# Patient Record
Sex: MALE | Race: BLACK OR AFRICAN AMERICAN | Employment: UNEMPLOYED | URBAN - METROPOLITAN AREA
[De-identification: names, ages, dates, MRNs, and addresses within clinical notes are randomized per-mention and may not be internally consistent; named-entity substitution may affect disease eponyms.]

---

## 2018-04-25 ENCOUNTER — OFFICE VISIT (OUTPATIENT)
Dept: ORTHOPEDIC SURGERY | Age: 48
End: 2018-04-25

## 2018-04-25 VITALS
HEART RATE: 91 BPM | WEIGHT: 186 LBS | HEIGHT: 68 IN | SYSTOLIC BLOOD PRESSURE: 121 MMHG | BODY MASS INDEX: 28.19 KG/M2 | RESPIRATION RATE: 16 BRPM | DIASTOLIC BLOOD PRESSURE: 76 MMHG

## 2018-04-25 DIAGNOSIS — S80.02XA CONTUSION OF LEFT KNEE, INITIAL ENCOUNTER: ICD-10-CM

## 2018-04-25 DIAGNOSIS — S52.572A OTHER CLOSED INTRA-ARTICULAR FRACTURE OF DISTAL END OF LEFT RADIUS, INITIAL ENCOUNTER: Primary | ICD-10-CM

## 2018-04-25 DIAGNOSIS — M25.532 LEFT WRIST PAIN: ICD-10-CM

## 2018-04-25 PROCEDURE — 25600 CLTX DST RDL FX/EPHYS SEP WO: CPT | Performed by: ORTHOPAEDIC SURGERY

## 2018-04-25 PROCEDURE — G8427 DOCREV CUR MEDS BY ELIG CLIN: HCPCS | Performed by: ORTHOPAEDIC SURGERY

## 2018-04-25 PROCEDURE — G8419 CALC BMI OUT NRM PARAM NOF/U: HCPCS | Performed by: ORTHOPAEDIC SURGERY

## 2018-04-25 PROCEDURE — L3908 WHO COCK-UP NONMOLDE PRE OTS: HCPCS | Performed by: ORTHOPAEDIC SURGERY

## 2018-04-25 PROCEDURE — 99203 OFFICE O/P NEW LOW 30 MIN: CPT | Performed by: ORTHOPAEDIC SURGERY

## 2018-04-25 PROCEDURE — 4004F PT TOBACCO SCREEN RCVD TLK: CPT | Performed by: ORTHOPAEDIC SURGERY

## 2018-04-25 RX ORDER — TRAMADOL HYDROCHLORIDE 50 MG/1
50 TABLET ORAL EVERY 6 HOURS PRN
Qty: 28 TABLET | Refills: 0 | Status: SHIPPED | OUTPATIENT
Start: 2018-04-25 | End: 2018-04-25 | Stop reason: CLARIF

## 2018-04-26 PROBLEM — S80.02XA CONTUSION OF LEFT KNEE: Status: ACTIVE | Noted: 2018-04-26

## 2018-04-26 PROBLEM — S52.502A CLOSED FRACTURE OF LEFT DISTAL RADIUS: Status: ACTIVE | Noted: 2018-04-26

## 2018-04-27 DIAGNOSIS — S52.572A OTHER CLOSED INTRA-ARTICULAR FRACTURE OF DISTAL END OF LEFT RADIUS, INITIAL ENCOUNTER: Primary | ICD-10-CM

## 2018-04-27 PROCEDURE — APPNB15 APP NON BILLABLE TIME 0-15 MINS: Performed by: NURSE PRACTITIONER

## 2018-10-08 ENCOUNTER — HOSPITAL ENCOUNTER (EMERGENCY)
Age: 48
Discharge: HOME | End: 2018-10-08
Payer: COMMERCIAL

## 2018-10-08 VITALS
DIASTOLIC BLOOD PRESSURE: 83 MMHG | RESPIRATION RATE: 16 BRPM | HEART RATE: 86 BPM | BODY MASS INDEX: 30.09 KG/M2 | OXYGEN SATURATION: 99 % | WEIGHT: 192.13 LBS | TEMPERATURE: 97.9 F | SYSTOLIC BLOOD PRESSURE: 122 MMHG

## 2018-10-08 DIAGNOSIS — L24.9 IRRITANT CONTACT DERMATITIS, UNSPECIFIED TRIGGER: Primary | ICD-10-CM

## 2018-10-08 PROCEDURE — 6370000000 HC RX 637 (ALT 250 FOR IP): Performed by: PHYSICIAN ASSISTANT

## 2018-10-08 PROCEDURE — 99282 EMERGENCY DEPT VISIT SF MDM: CPT

## 2018-10-08 RX ORDER — PREDNISONE 20 MG/1
60 TABLET ORAL ONCE
Status: COMPLETED | OUTPATIENT
Start: 2018-10-08 | End: 2018-10-08

## 2018-10-08 RX ORDER — DIPHENHYDRAMINE HCL 25 MG
25 TABLET ORAL ONCE
Status: COMPLETED | OUTPATIENT
Start: 2018-10-08 | End: 2018-10-08

## 2018-10-08 RX ORDER — FAMOTIDINE 20 MG/1
20 TABLET, FILM COATED ORAL 2 TIMES DAILY
Qty: 60 TABLET | Refills: 0 | Status: SHIPPED | OUTPATIENT
Start: 2018-10-08

## 2018-10-08 RX ORDER — PREDNISONE 10 MG/1
TABLET ORAL
Qty: 44 TABLET | Refills: 0 | Status: SHIPPED | OUTPATIENT
Start: 2018-10-08 | End: 2018-10-18

## 2018-10-08 RX ORDER — FAMOTIDINE 20 MG/1
40 TABLET, FILM COATED ORAL ONCE
Status: COMPLETED | OUTPATIENT
Start: 2018-10-08 | End: 2018-10-08

## 2018-10-08 RX ORDER — DIPHENHYDRAMINE HCL 25 MG
25 CAPSULE ORAL EVERY 6 HOURS PRN
Qty: 28 CAPSULE | Refills: 0 | Status: SHIPPED | OUTPATIENT
Start: 2018-10-08 | End: 2018-10-18

## 2018-10-08 RX ADMIN — FAMOTIDINE 40 MG: 20 TABLET, FILM COATED ORAL at 19:20

## 2018-10-08 RX ADMIN — PREDNISONE 60 MG: 20 TABLET ORAL at 19:20

## 2018-10-08 RX ADMIN — DIPHENHYDRAMINE HCL 25 MG: 25 TABLET ORAL at 19:19

## 2018-10-08 ASSESSMENT — ENCOUNTER SYMPTOMS
RESPIRATORY NEGATIVE: 1
CONSTIPATION: 0
NAUSEA: 0
COUGH: 0
DIARRHEA: 0
SHORTNESS OF BREATH: 0
ABDOMINAL PAIN: 0
COLOR CHANGE: 1
VOMITING: 0

## 2018-10-09 NOTE — ED PROVIDER NOTES
as needed for Sleep.      naproxen (NAPROSYN) 500 MG tablet Take 500 mg by mouth 2 times daily (with meals). doxycycline (VIBRAMYCIN) 100 MG capsule Take 100 mg by mouth 2 times daily. potassium citrate (UROCIT-K) 10 MEQ (1080 MG) SR tablet Take 10 mEq by mouth 3 times daily (with meals). emtricitabine-tenofovir (TRUVADA) 200-300 MG per tablet Take 1 tablet by mouth daily. Review of Systems:  Review of Systems   Constitutional: Negative for activity change, appetite change, chills and fever. Respiratory: Negative. Negative for cough and shortness of breath. Cardiovascular: Negative. Negative for chest pain. Gastrointestinal: Negative for abdominal pain, constipation, diarrhea, nausea and vomiting. Genitourinary: Negative for difficulty urinating and dysuria. Musculoskeletal: Negative for arthralgias, myalgias, neck pain and neck stiffness. Skin: Positive for color change and rash. Negative for pallor and wound. Neurological: Negative for dizziness, weakness, light-headedness, numbness and headaches. Positives and Pertinent negatives as per HPI. Except as noted above in the ROS, problem specific ROS was completed and is negative. Physical Exam:  Physical Exam   Constitutional: He is oriented to person, place, and time. He appears well-developed and well-nourished. HENT:   Head: Normocephalic and atraumatic. Right Ear: External ear normal.   Left Ear: External ear normal.   Mouth/Throat: Oropharynx is clear and moist. No oropharyngeal exudate. Eyes: Conjunctivae are normal. Right eye exhibits no discharge. Left eye exhibits no discharge. Neck: Normal range of motion. Neck supple. Cardiovascular: Normal rate, regular rhythm, normal heart sounds and intact distal pulses. Exam reveals no gallop and no friction rub. No murmur heard. Pulmonary/Chest: Effort normal and breath sounds normal. No stridor. No respiratory distress. He has no wheezes.  He has no rales. He exhibits no tenderness. Abdominal: Soft. He exhibits no distension. There is no tenderness. There is no rebound and no guarding. Musculoskeletal: Normal range of motion. Neurological: He is alert and oriented to person, place, and time. Skin: Skin is warm and dry. Rash noted. He is not diaphoretic. No erythema. No pallor. Upon examination patient has urticarial rash noted to the abdomen, chest, back, arms and legs. Spares the palms. Does not involve the face or oral mucosal.  Associated excoriations noted. No vesicles, bullae or pustules noted. No crepitus. No induration or fluctuance. No erythema or warmth. Psychiatric: He has a normal mood and affect. His behavior is normal.       MEDICAL DECISION MAKING    Vitals:    Vitals:    10/08/18 1820   BP: 122/83   Pulse: 86   Resp: 16   Temp: 97.9 °F (36.6 °C)   TempSrc: Infrared   SpO2: 99%   Weight: 192 lb 2 oz (87.1 kg)       LABS: Labs Reviewed - No data to display     Remainder of labs reviewed and were negative at this time or not returned at the time of this note. RADIOLOGY:   Non-plain film images such as CT, Ultrasound and MRI are read by the radiologist. SALVADOR Frankel have directly visualized the radiologic plain film image(s) with the below findings:        Interpretation per the Radiologist below, if available at the time of this note:    No orders to display        No results found. MEDICAL DECISION MAKING / ED COURSE:      PROCEDURES:   Procedures    None    Patient was given:  Medications   diphenhydrAMINE (BENADRYL) tablet 25 mg (25 mg Oral Given 10/8/18 1919)   famotidine (PEPCID) tablet 40 mg (40 mg Oral Given 10/8/18 1920)   predniSONE (DELTASONE) tablet 60 mg (60 mg Oral Given 10/8/18 1920)       Patient is a 26-year-old male who presents ED with complaint of a rash. Upon examination patient is a rash that appears consistent with dermatitis/allergic reaction.   Given Benadryl, Pepcid and prednisone here in mis-transcribed.)    Electronically signed, SALVADOR Multani,         [facility], [address]  10/09/18 2023

## 2019-10-07 PROBLEM — Z51.89 ENCOUNTER FOR REHABILITATION: Status: ACTIVE | Noted: 2019-02-23

## 2019-10-07 PROBLEM — M47.816 SPONDYLOSIS OF LUMBAR REGION WITHOUT MYELOPATHY OR RADICULOPATHY: Status: ACTIVE | Noted: 2018-10-24

## 2019-10-07 PROBLEM — M70.62 GREATER TROCHANTERIC BURSITIS, LEFT: Status: ACTIVE | Noted: 2017-10-30

## 2019-10-07 PROBLEM — E53.8 B12 DEFICIENCY: Status: ACTIVE | Noted: 2019-09-23

## 2019-10-07 PROBLEM — G89.4 CHRONIC PAIN DISORDER: Status: ACTIVE | Noted: 2018-10-24

## 2019-10-07 PROBLEM — T84.59XD PROSTHETIC HIP INFECTION, SUBSEQUENT ENCOUNTER: Status: ACTIVE | Noted: 2019-02-18

## 2019-10-07 PROBLEM — D63.8 ANEMIA OF CHRONIC DISEASE: Status: ACTIVE | Noted: 2019-07-25

## 2019-10-07 PROBLEM — J45.30 MILD PERSISTENT ASTHMA WITHOUT COMPLICATION: Status: ACTIVE | Noted: 2019-07-25

## 2019-10-07 PROBLEM — F17.210 CIGARETTE NICOTINE DEPENDENCE WITHOUT COMPLICATION: Status: ACTIVE | Noted: 2019-07-25

## 2019-10-07 PROBLEM — E55.9 VITAMIN D DEFICIENCY: Status: ACTIVE | Noted: 2019-07-25

## 2019-10-07 PROBLEM — Z96.649 PROSTHETIC HIP INFECTION, SUBSEQUENT ENCOUNTER: Status: ACTIVE | Noted: 2019-02-18

## 2019-10-07 PROBLEM — S80.02XA CONTUSION OF LEFT KNEE: Status: RESOLVED | Noted: 2018-04-26 | Resolved: 2019-10-07

## 2019-10-07 PROBLEM — F99 INSOMNIA DUE TO OTHER MENTAL DISORDER: Status: ACTIVE | Noted: 2019-07-25

## 2019-10-07 PROBLEM — E66.9 OBESITY (BMI 30-39.9): Status: ACTIVE | Noted: 2019-07-25

## 2019-10-07 PROBLEM — F51.05 INSOMNIA DUE TO OTHER MENTAL DISORDER: Status: ACTIVE | Noted: 2019-07-25

## 2020-07-17 ENCOUNTER — APPOINTMENT (OUTPATIENT)
Dept: GENERAL RADIOLOGY | Age: 50
End: 2020-07-17
Payer: COMMERCIAL

## 2020-07-17 ENCOUNTER — HOSPITAL ENCOUNTER (EMERGENCY)
Age: 50
Discharge: HOME | End: 2020-07-17
Payer: COMMERCIAL

## 2020-07-17 VITALS
TEMPERATURE: 98.5 F | DIASTOLIC BLOOD PRESSURE: 80 MMHG | RESPIRATION RATE: 18 BRPM | HEART RATE: 84 BPM | OXYGEN SATURATION: 99 % | SYSTOLIC BLOOD PRESSURE: 126 MMHG

## 2020-07-17 PROCEDURE — 99283 EMERGENCY DEPT VISIT LOW MDM: CPT

## 2020-07-17 PROCEDURE — 72040 X-RAY EXAM NECK SPINE 2-3 VW: CPT

## 2020-07-17 PROCEDURE — 72100 X-RAY EXAM L-S SPINE 2/3 VWS: CPT

## 2020-07-17 PROCEDURE — 6370000000 HC RX 637 (ALT 250 FOR IP): Performed by: PHYSICIAN ASSISTANT

## 2020-07-17 RX ORDER — CYCLOBENZAPRINE HCL 10 MG
10 TABLET ORAL 3 TIMES DAILY PRN
Qty: 12 TABLET | Refills: 0 | Status: SHIPPED | OUTPATIENT
Start: 2020-07-17

## 2020-07-17 RX ORDER — CYCLOBENZAPRINE HCL 10 MG
10 TABLET ORAL ONCE
Status: COMPLETED | OUTPATIENT
Start: 2020-07-17 | End: 2020-07-17

## 2020-07-17 RX ORDER — OXYCODONE HYDROCHLORIDE 5 MG/1
5 TABLET ORAL ONCE
Status: COMPLETED | OUTPATIENT
Start: 2020-07-17 | End: 2020-07-17

## 2020-07-17 RX ADMIN — OXYCODONE HYDROCHLORIDE 5 MG: 5 TABLET ORAL at 17:50

## 2020-07-17 RX ADMIN — CYCLOBENZAPRINE HYDROCHLORIDE 10 MG: 10 TABLET, FILM COATED ORAL at 17:48

## 2020-07-17 ASSESSMENT — PAIN DESCRIPTION - PROGRESSION
CLINICAL_PROGRESSION_2: NOT CHANGED
CLINICAL_PROGRESSION: NOT CHANGED

## 2020-07-17 ASSESSMENT — ENCOUNTER SYMPTOMS
VOMITING: 0
COLOR CHANGE: 0
BACK PAIN: 1
CONSTIPATION: 0
SHORTNESS OF BREATH: 0
ABDOMINAL PAIN: 0

## 2020-07-17 ASSESSMENT — PAIN DESCRIPTION - ORIENTATION
ORIENTATION: MID
ORIENTATION_2: LOWER

## 2020-07-17 ASSESSMENT — PAIN DESCRIPTION - LOCATION
LOCATION: NECK
LOCATION_2: BACK

## 2020-07-17 ASSESSMENT — PAIN - FUNCTIONAL ASSESSMENT: PAIN_FUNCTIONAL_ASSESSMENT: PREVENTS OR INTERFERES SOME ACTIVE ACTIVITIES AND ADLS

## 2020-07-17 ASSESSMENT — PAIN SCALES - GENERAL
PAINLEVEL_OUTOF10: 7
PAINLEVEL_OUTOF10: 6

## 2020-07-17 ASSESSMENT — PAIN DESCRIPTION - ONSET
ONSET: ON-GOING
ONSET_2: ON-GOING

## 2020-07-17 ASSESSMENT — PAIN DESCRIPTION - PAIN TYPE: TYPE: ACUTE PAIN

## 2020-07-17 ASSESSMENT — PAIN DESCRIPTION - INTENSITY: RATING_2: 8

## 2020-07-17 ASSESSMENT — PAIN DESCRIPTION - FREQUENCY: FREQUENCY: CONTINUOUS

## 2020-07-17 ASSESSMENT — PAIN DESCRIPTION - DURATION: DURATION_2: CONTINUOUS
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
## 2021-08-07 PROCEDURE — 99284 EMERGENCY DEPT VISIT MOD MDM: CPT

## 2021-08-07 PROCEDURE — 96372 THER/PROPH/DIAG INJ SC/IM: CPT

## 2021-08-08 ENCOUNTER — APPOINTMENT (OUTPATIENT)
Dept: GENERAL RADIOLOGY | Age: 51
End: 2021-08-08
Payer: COMMERCIAL

## 2021-08-08 ENCOUNTER — HOSPITAL ENCOUNTER (EMERGENCY)
Age: 51
Discharge: HOME | End: 2021-08-08
Payer: COMMERCIAL

## 2021-08-08 VITALS
TEMPERATURE: 98.7 F | OXYGEN SATURATION: 98 % | RESPIRATION RATE: 16 BRPM | HEIGHT: 67 IN | HEART RATE: 74 BPM | DIASTOLIC BLOOD PRESSURE: 89 MMHG | BODY MASS INDEX: 32.18 KG/M2 | SYSTOLIC BLOOD PRESSURE: 149 MMHG | WEIGHT: 205 LBS

## 2021-08-08 DIAGNOSIS — M79.674 GREAT TOE PAIN, RIGHT: Primary | ICD-10-CM

## 2021-08-08 PROCEDURE — 6360000002 HC RX W HCPCS: Performed by: PHYSICIAN ASSISTANT

## 2021-08-08 PROCEDURE — 73660 X-RAY EXAM OF TOE(S): CPT

## 2021-08-08 RX ORDER — NAPROXEN 500 MG/1
500 TABLET ORAL 2 TIMES DAILY WITH MEALS
Qty: 20 TABLET | Refills: 0 | Status: SHIPPED | OUTPATIENT
Start: 2021-08-08 | End: 2021-08-18

## 2021-08-08 RX ADMIN — HYDROMORPHONE HYDROCHLORIDE 0.5 MG: 1 INJECTION, SOLUTION INTRAMUSCULAR; INTRAVENOUS; SUBCUTANEOUS at 02:09

## 2021-08-08 ASSESSMENT — PAIN SCALES - GENERAL
PAINLEVEL_OUTOF10: 2
PAINLEVEL_OUTOF10: 10

## 2021-08-08 ASSESSMENT — ENCOUNTER SYMPTOMS: COLOR CHANGE: 1

## 2021-08-08 NOTE — ED PROVIDER NOTES
[address] [facility]      Pt Name: Jamari Cortes  MRN: [id number]  [place] 1970  Date of evaluation: 8/7/2021  Provider: SALVADOR Inman    This patient was not seen and evaluated by the attending physician No att. providers found. CHIEF COMPLAINT       Chief Complaint   Patient presents with    Toe Injury     Pt has history of gout and current has a red and swollen right great toe       CRITICAL CARE TIME   I performed a total Critical Care time of 15 minutes, excluding separately reportable procedures. There was a high probability of clinically significant/life threatening deterioration in the patient's condition which required my urgent intervention. Not limited to multiple reexaminations, discussions with attending physician and consultants. HISTORY OF PRESENT ILLNESS  (Location/Symptom, Timing/Onset, Context/Setting, Quality, Duration, Modifying Factors, Severity.)   Jamari Cortes is a 53 y.o. male who presents to the emergency department with complaint of right great toe pain. He states that he has not had fever he has history of HIV but states he is undetectable. He states that it feels exact like prior gout that he had in the left great toe. He has a podiatrist to follow-up with although he is unsure of the name. He took his prescribed medications including oxycodone at home with minimal relief today. Rates his pain a 10 out of 10. Has not noted any redness or spreading rash of the foot. Not a diabetic. Nursing Notes were reviewed and I agree. REVIEW OF SYSTEMS    (2-9 systems for level 4, 10 or more for level 5)     Review of Systems   Constitutional: Negative for fever. Musculoskeletal: Positive for arthralgias and joint swelling. Skin: Positive for color change. Negative for wound. Neurological: Negative for numbness. Psychiatric/Behavioral: Negative for agitation and behavioral problems. Except as noted above the remainder of the review of systems was reviewed and negative. PAST MEDICAL HISTORY         Diagnosis Date    Acute appendicitis with generalized peritonitis     Anemia of chronic disease 7/25/2019    Hgb 10.4 on 4/19/19 (). Last Assessment & Plan:  Condition: stable  Follow up in: one year    Anxiety and depression 2/8/2009    Last Assessment & Plan: On wellbutrin Use klonipine sparingly    Asthma     Asymptomatic human immunodeficiency virus (hiv) infection status ([facility] 2/8/2009    Diagnosed 12/1999 Cd4 201 VL [address]56 baseline 1/2000 Genotype nevirapine/sustiva resistance/emtriva 11/2008 Combivir NVP 1/2000 Combivir Norvir Reyataz 2/2005 genvoya 11/2016 biktarvy  Last Assessment & Plan:  Doing well on biktarvy Labs today  Follows with MARINA Key   HIV Positive/AIDS   History of Present Illness Mr. Fidencio Castle is a 53 y.o. male diagnosed with HIV on 2000 . His initial IDC vi    AVN (avascular necrosis of bone) ([facility] [address] 7/31/2014    B12 deficiency 9/23/2019    Last Assessment & Plan:  b12 replacement today    Bipolar 1 disorder ([facility] [address] [facility]     Bipolar 1 disorder, depressed, mild ([facility] [address] [facility] 9/5/2016    SSI disability for mental health. Depression Screening (PHQ-9) completed this visit - see results. Sx stable on SSRI / SNRI, Antipsychotic and Benzo's. Use's Ambien for sleep. Member has no thoughts, plans or intentions to harm himself / herself or others. Last Assessment & Plan:  Condition: stable  Pt seeing mental health professional regularly and last visit in June. Take medications as orde    Bipolar 2 disorder ([facility] [address] 11/27/2014    Chronic pain disorder 10/24/2018    Last Assessment & Plan:  Avoid narcotics Continue gabapentin    Cigarette nicotine dependence without complication 9/76/9472    Wants to quit, weaning. Last Assessment & Plan:  Condition: stable  Discussed risk of Lung Cancer, COPD, PAD, Stroke, Heart Attack and Death.   Discussed pharmaceutical and non-pharmaceutical options to quit. Encouraged to quit. 800-QUIT-NOW phone number discussed. Follow up in: one year    Closed fracture of left distal radius 4/26/2018    Cocaine abuse ([facility] 9/2/2016    + UDS 2016 - denies further use. Last Assessment & Plan:  Condition: stable  Follow up in: one year    Coccyx disorder 6/26/2014    Condyloma 12/12/2016    Last Assessment & Plan:  Refer for eval at colorectal    Contusion of left knee 4/26/2018    Encounter for rehabilitation 2/23/2019    Essential hypertension 2/8/2009    ICD-10 Transition  Last Assessment & Plan:  controlled    Extrinsic asthma 2/8/2009    ICD-10 Transition  Last Assessment & Plan:  Controlled with singulair    Fall 5/26/2013    Fever     Gait abnormality 1/12/2015    Last Assessment & Plan:  Check vit d and B12    Greater trochanteric bursitis, left 10/30/2017    Hep B w/o coma     Hepatitis B carrier ([facility] [address] 8/12/2009    Last Assessment & Plan:  Check viral load today    Hip pain 6/26/2014    Last Assessment & Plan:  Renew tramadol Wants to resume pain contract Suggest seeing ortho for hip pain and if no improvement to pain management    HIV (human immunodeficiency virus infection) ([facility] [address]     HNP (herniated nucleus pulposus), lumbar 3/29/2013    Hypertension     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 Renal Fxn: Stage 2 CKD. Creat ranges 1.1 - 1.6, Gfr 61-90 (), Hx of LUIS requiring dialysis. Echo: 9/3/2016 ([facility])  Summary: Overall left ventricular ejection fraction is estimated to be 40-45%.  The left ventricular fun    Hypokalemia 11/27/2014    Insomnia due to other mental disorder 7/25/2019    Ambien  Last Assessment & Plan:  Condition: stable Follow up in: one year    Knee pain, left 1/15/2015    Last Assessment & Plan:  Renew tramadol No sign diversion    Line sepsis ([address]     Low back pain radiating to left lower extremity 9/30/2015    Last Assessment & Plan:  Is getting epidural Avoid any nsaids Check urine drug screen  Follows with Pain Management for  DDD (degenerative disc disease), lumbar (Primary Dx); Spondylosis of lumbar region without radiculopathy;  Chronic pain syndrome  Last Assessment & Plan:  Condition: stable  Take pain medication as prescribed. Practice non-pharmacological pain reduction techniques/intervention    Mild persistent asthma without complication 9/33/4502    Dx in infancy, smoker, weaning. Last Assessment & Plan:  Condition: stable  Reviewed trigger avoidance and reviewed proper use of inhalers and rescue medications. Reviewed concerning signs/symptoms and ER precautions. Follow up in: one year    Obesity (BMI 30-39. 9) 7/25/2019    Co-morbid's - HTN  Last Assessment & Plan:  Condition: stable  Educated patient on normal BMI range of 18.5 to 24.9  Advised to monitor nutrition to not exceed caloric needs, or as indicated by PCP in order to maintain a healthy weight and BMI. Advised to engage in aerobic physical activity, if indicated to be safe by PCP, to assist with maintaining a healthy weight and BMI.    Advised to follow u    Osteoarthritis, knee 2/26/2015    Last Assessment & Plan:  Continue routine ortho and pain followup I am not willing to prescribe pain meds other then tramadol and gabapentin    Panic attack     Prosthetic hip infection, subsequent encounter 2/18/2019    Added automatically from request for surgery [id number]  Added automatically from request for surgery [id number]    S/P total hip arthroplasty 11/11/2014    Last Assessment & Plan:  Was on lovenox Needs 2 more days however now fully ambuloatory Has follow up with dr Twila Lora Need to determine pain management Says he was on antiboitics but I have no documentation Check esr    Sepsis ([facility] [address] 11/27/2014    Spondylosis of lumbar region without myelopathy or radiculopathy 10/24/2018    Thoracic or lumbosacral neuritis or radiculitis, unspecified 3/29/2013    Vitamin D deficiency 7/25/2019    Initial level was 14.2 on 10/27/16, Latest level was 26.4 on 5/15/2017 (). Last Assessment & Plan:  Condition: stable  Follow up in: six months    Weakness        SURGICAL HISTORY           Procedure Laterality Date    FOOT SURGERY Left     JOINT REPLACEMENT      left hip       CURRENT MEDICATIONS       Discharge Medication List as of 8/8/2021  3:00 AM      CONTINUE these medications which have NOT CHANGED    Details   cyclobenzaprine (FLEXERIL) 10 MG tablet Take 1 tablet by mouth 3 times daily as needed for Muscle spasms Sedation precautions, Disp-12 tablet,R-0Print      famotidine (PEPCID) 20 MG tablet Take 1 tablet by mouth 2 times daily, Disp-60 tablet, R-0Print      ibuprofen (ADVIL;MOTRIN) 800 MG tablet Take 1 tablet by mouth every 8 hours as needed for Pain, Disp-30 tablet, R-0Print      Acetaminophen 650 MG TABS Take 650 mg by mouth every 4 hours as needed for Pain, Disp-120 tablet, R-0      docusate sodium (COLACE) 100 MG capsule Take 1 capsule by mouth 2 times daily as needed for Constipation (take while on narcotic pain medication), Disp-60 capsule, R-0      Ferrous Sulfate (IRON) 325 (65 FE) MG TABS Take 1 tablet by mouth 2 times daily (with meals). , Disp-30 tablet, R-0      fosamprenavir (LEXIVA) 700 MG tablet Take 2 tablets by mouth daily. , Disp-60 tablet, R-3      ritonavir (NORVIR) 100 MG capsule Take 1 capsule by mouth daily. , Disp-30 capsule, R-3      montelukast (SINGULAIR) 10 MG tablet Take 10 mg by mouth nightly. hydrochlorothiazide (HYDRODIURIL) 25 MG tablet Take 25 mg by mouth daily. gabapentin (NEURONTIN) 300 MG capsule Take 300 mg by mouth 3 times daily.       calcium carbonate (ANTACID) 500 MG chewable tablet Take 1 tablet by mouth every 4 hours as needed for Heartburn. As needed for indigestion      buPROPion (WELLBUTRIN) 100 MG tablet Take 100 mg by mouth 2 times daily. amLODIPine (NORVASC) 10 MG tablet Take 10 mg by mouth daily. albuterol (PROAIR HFA) 108 (90 BASE) MCG/ACT inhaler Inhale 2 puffs into the lungs every 6 hours as needed for Wheezing. sertraline (ZOLOFT) 100 MG tablet Take 100 mg by mouth daily. Take 2 tablets by mouth every day      Pantoprazole Sodium (PROTONIX) 40 MG PACK packet Take 40 mg by mouth daily. clonazePAM (KLONOPIN) 1 MG tablet Take 1 mg by mouth 3 times daily as needed for Anxiety. zolpidem (AMBIEN) 10 MG tablet Take  by mouth nightly as needed for Sleep. doxycycline (VIBRAMYCIN) 100 MG capsule Take 100 mg by mouth 2 times daily. potassium citrate (UROCIT-K) 10 MEQ (1080 MG) SR tablet Take 10 mEq by mouth 3 times daily (with meals). emtricitabine-tenofovir (TRUVADA) 200-300 MG per tablet Take 1 tablet by mouth daily. ALLERGIES     Shellfish-derived products, Penicillins, and Efavirenz    FAMILY HISTORY     No family history on file. No family status information on file. SOCIAL HISTORY      reports that he has been smoking. He has never used smokeless tobacco. He reports that he does not drink alcohol and does not use drugs. PHYSICAL EXAM    (up to 7 for level 4, 8 or more for level 5)     ED Triage Vitals   BP Temp Temp Source Pulse Resp SpO2 Height Weight   08/08/21 0103 08/08/21 0103 08/08/21 0103 08/08/21 0140 08/08/21 0103 08/08/21 0103 08/08/21 0103 08/08/21 0103   (!) 149/89 98.7 °F (37.1 °C) Oral 74 16 98 % 5' 7\" (1.702 m) 205 lb (93 kg)       Physical Exam  Vitals reviewed. Constitutional:       Appearance: Normal appearance. HENT:      Head: Normocephalic and atraumatic. Eyes:      Pupils: Pupils are equal, round, and reactive to light. Cardiovascular:      Rate and Rhythm: Normal rate. Pulses: Normal pulses.    Pulmonary: Effort: Pulmonary effort is normal. No respiratory distress. Musculoskeletal:      Cervical back: Normal range of motion. Feet:    Skin:     General: Skin is warm. Findings: Erythema present. Neurological:      General: No focal deficit present. Mental Status: He is alert and oriented to person, place, and time. Psychiatric:         Mood and Affect: Mood normal.         DIAGNOSTIC RESULTS     RADIOLOGY:   Non-plain film images such as CT, Ultrasound and MRI are read by the radiologist. Plain radiographic images are visualized and preliminarily interpreted by SALVADOR Crockett with the below findings:    Reviewed radiologist's interpretation. Interpretation per the Radiologist below, if available at the time of this note:    XR TOE RIGHT (MIN 2 VIEWS)   Final Result   Diffuse soft tissue edema suggests underlying cellulitis. No evidence of   osteomyelitis. LABS:  Labs Reviewed - No data to display    All other labs were within normal range or not returned as of this dictation. EMERGENCY DEPARTMENT COURSE and DIFFERENTIAL DIAGNOSIS/MDM:   Vitals:    Vitals:    08/08/21 0103 08/08/21 0140   BP: (!) 149/89    Pulse:  74   Resp: 16    Temp: 98.7 °F (37.1 °C)    TempSrc: Oral    SpO2: 98%    Weight: 205 lb (93 kg)    Height: 5' 7\" (1.702 m)      I discussed with Denton Daniel and/or family the exam results, diagnosis, care, prognosis, reasons to return and the importance of follow up. Patient and/or family is in full agreement with plan and all questions have been answered. Specific discharge instructions explained, including reasons to return to the emergency department. Denton Daniel is well appearing, non-toxic, and afebrile at the time of discharge. Patient has swollen red tender right great toe at the proximal joint. Feels like prior gout. X-ray shows no bony abnormality. His pulses intact in the foot with good cap refill and intact in the toe. No fevers.   He has a history of HIV but states he is undetectable. No history of diabetes. He is given a dose of IM pain medication here as he is on oral pain medication at home he can follow-up with his pain management doctor and podiatrist however discussed the possibility of developing oral call to bacterial infection will cover him with an antibiotic he is given strict return precautions including fever spreading redness or other concerns. I estimate there is LOW risk for FRACTURE, COMPARTMENT SYNDROME, DEEP VENOUS THROMBOSIS, SEPTIC ARTHRITIS, TENDON OR NEUROVASCULAR INJURY, thus I consider the discharge disposition reasonable. CONSULTS:  None    PROCEDURES:  Procedures      FINAL IMPRESSION      1.  Great toe pain, right          DISPOSITION/PLAN   DISPOSITION Decision To Discharge 08/08/2021 02:55:43 AM      PATIENT REFERRED TO:  Dwayne Arce DPM  [facility] [place] [address]  [phone]    Call in 1 day  For follow up with foot Dr. Imtiaz Nichols:  Discharge Medication List as of 8/8/2021  3:00 AM          (Please note that portions of this note were completed with a voice recognition program.  Efforts were made to edit the dictations but occasionally words are mis-transcribed.)    Jamel November, [address], [place]  08/08/21 1347

## 2021-08-08 NOTE — ED NOTES
.Pt discharged at this time. Discharge instructions and medications reviewed,  Questions were answered. PT verbalized understanding. Follow up appointments were discussed.          Swathi Hart, [place]  08/08/21 [id number]

## 2025-09-01 ENCOUNTER — HOSPITAL ENCOUNTER (EMERGENCY)
Age: 55
Discharge: HOME | End: 2025-09-02
Attending: EMERGENCY MEDICINE
Payer: MEDICAID

## 2025-09-01 VITALS
TEMPERATURE: 96.8 F | OXYGEN SATURATION: 98 % | HEART RATE: 86 BPM | RESPIRATION RATE: 18 BRPM | DIASTOLIC BLOOD PRESSURE: 98 MMHG | SYSTOLIC BLOOD PRESSURE: 154 MMHG | WEIGHT: 214.73 LBS | HEIGHT: 70 IN | BODY MASS INDEX: 30.74 KG/M2
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
## 2025-09-01 DIAGNOSIS — S70.02XA CONTUSION OF LEFT HIP, INITIAL ENCOUNTER: ICD-10-CM

## 2025-09-01 DIAGNOSIS — Z96.659 MECHANICAL COMPLICATION OF PROSTHETIC KNEE IMPLANT, INITIAL ENCOUNTER: Primary | ICD-10-CM
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
## 2025-09-01 DIAGNOSIS — S90.31XA CONTUSION OF RIGHT HEEL, INITIAL ENCOUNTER: ICD-10-CM

## 2025-09-01 DIAGNOSIS — T84.098A MECHANICAL COMPLICATION OF PROSTHETIC KNEE IMPLANT, INITIAL ENCOUNTER: Primary | ICD-10-CM

## 2025-09-01 PROCEDURE — 99284 EMERGENCY DEPT VISIT MOD MDM: CPT

## 2025-09-02 ENCOUNTER — APPOINTMENT (OUTPATIENT)
Dept: GENERAL RADIOLOGY | Age: 55
End: 2025-09-02
Payer: MEDICAID

## 2025-09-02 ENCOUNTER — APPOINTMENT (OUTPATIENT)
Dept: CT IMAGING | Age: 55
End: 2025-09-02
Payer: MEDICAID

## 2025-09-02 PROBLEM — T84.098A MECHANICAL COMPLICATION OF PROSTHETIC KNEE IMPLANT: Status: ACTIVE | Noted: 2025-09-02

## 2025-09-02 PROBLEM — Z96.659 MECHANICAL COMPLICATION OF PROSTHETIC KNEE IMPLANT: Status: ACTIVE | Noted: 2025-09-02

## 2025-09-02 PROCEDURE — 73560 X-RAY EXAM OF KNEE 1 OR 2: CPT

## 2025-09-02 PROCEDURE — 73502 X-RAY EXAM HIP UNI 2-3 VIEWS: CPT

## 2025-09-02 PROCEDURE — 73630 X-RAY EXAM OF FOOT: CPT

## 2025-09-02 PROCEDURE — 71046 X-RAY EXAM CHEST 2 VIEWS: CPT

## 2025-09-02 PROCEDURE — 6370000000 HC RX 637 (ALT 250 FOR IP): Performed by: EMERGENCY MEDICINE

## 2025-09-02 PROCEDURE — 72125 CT NECK SPINE W/O DYE: CPT

## 2025-09-02 PROCEDURE — 70450 CT HEAD/BRAIN W/O DYE: CPT

## 2025-09-02 RX ORDER — IBUPROFEN 800 MG/1
800 TABLET, FILM COATED ORAL 3 TIMES DAILY PRN
Qty: 30 TABLET | Refills: 0 | Status: SHIPPED | OUTPATIENT
Start: 2025-09-02 | End: 2025-09-12

## 2025-09-02 RX ORDER — OXYCODONE AND ACETAMINOPHEN 5; 325 MG/1; MG/1
1 TABLET ORAL ONCE
Refills: 0 | Status: COMPLETED | OUTPATIENT
Start: 2025-09-02 | End: 2025-09-02

## 2025-09-02 RX ADMIN — OXYCODONE HYDROCHLORIDE AND ACETAMINOPHEN 1 TABLET: 5; 325 TABLET ORAL at 02:56

## 2025-09-02 ASSESSMENT — PAIN DESCRIPTION - LOCATION: LOCATION: HEAD;NECK;FOOT

## 2025-09-02 ASSESSMENT — PAIN - FUNCTIONAL ASSESSMENT
PAIN_FUNCTIONAL_ASSESSMENT: 0-10
PAIN_FUNCTIONAL_ASSESSMENT: PREVENTS OR INTERFERES SOME ACTIVE ACTIVITIES AND ADLS

## 2025-09-02 ASSESSMENT — PAIN DESCRIPTION - DESCRIPTORS: DESCRIPTORS: DISCOMFORT

## 2025-09-02 ASSESSMENT — PAIN SCALES - GENERAL
PAINLEVEL_OUTOF10: 8
PAINLEVEL_OUTOF10: 10

## 2025-09-02 ASSESSMENT — PAIN DESCRIPTION - FREQUENCY: FREQUENCY: CONTINUOUS

## 2025-09-02 ASSESSMENT — PAIN DESCRIPTION - ONSET: ONSET: ON-GOING

## 2025-09-02 ASSESSMENT — PAIN DESCRIPTION - PAIN TYPE: TYPE: ACUTE PAIN
